# Patient Record
Sex: FEMALE | Race: WHITE | NOT HISPANIC OR LATINO | Employment: PART TIME | URBAN - METROPOLITAN AREA
[De-identification: names, ages, dates, MRNs, and addresses within clinical notes are randomized per-mention and may not be internally consistent; named-entity substitution may affect disease eponyms.]

---

## 2024-01-02 ENCOUNTER — HOSPITAL ENCOUNTER (EMERGENCY)
Facility: HOSPITAL | Age: 24
Discharge: HOME | End: 2024-01-03
Attending: EMERGENCY MEDICINE | Admitting: EMERGENCY MEDICINE
Payer: COMMERCIAL

## 2024-01-02 ENCOUNTER — APPOINTMENT (OUTPATIENT)
Dept: CT IMAGING | Facility: HOSPITAL | Age: 24
End: 2024-01-02
Payer: COMMERCIAL

## 2024-01-02 DIAGNOSIS — R16.1 SPLENOMEGALY: ICD-10-CM

## 2024-01-02 DIAGNOSIS — R00.0 SINUS TACHYCARDIA BY ELECTROCARDIOGRAM: Primary | ICD-10-CM

## 2024-01-02 DIAGNOSIS — E87.6 HYPOKALEMIA: ICD-10-CM

## 2024-01-02 DIAGNOSIS — D50.9 MICROCYTIC ANEMIA: ICD-10-CM

## 2024-01-02 DIAGNOSIS — D75.839 THROMBOCYTOSIS, UNSPECIFIED: ICD-10-CM

## 2024-01-02 DIAGNOSIS — J18.9 ATYPICAL PNEUMONIA: ICD-10-CM

## 2024-01-02 DIAGNOSIS — R60.9 PERIPHERAL EDEMA: ICD-10-CM

## 2024-01-02 DIAGNOSIS — L03.115 CELLULITIS OF BOTH FEET: ICD-10-CM

## 2024-01-02 DIAGNOSIS — L03.116 CELLULITIS OF BOTH FEET: ICD-10-CM

## 2024-01-02 DIAGNOSIS — R79.89 ELEVATED D-DIMER: ICD-10-CM

## 2024-01-02 DIAGNOSIS — M54.9 ACUTE UPPER BACK PAIN: ICD-10-CM

## 2024-01-02 DIAGNOSIS — E88.09 HYPOALBUMINEMIA: ICD-10-CM

## 2024-01-02 DIAGNOSIS — D72.825 BANDEMIA: ICD-10-CM

## 2024-01-02 DIAGNOSIS — E87.1 HYPONATREMIA: ICD-10-CM

## 2024-01-02 LAB
ALBUMIN SERPL-MCNC: 2.8 G/DL (ref 3.5–5.2)
ALBUMIN/GLOB SERPL: 0.6 G/DL
ALP SERPL-CCNC: 116 U/L (ref 39–117)
ALT SERPL W P-5'-P-CCNC: 8 U/L (ref 1–33)
ANION GAP SERPL CALCULATED.3IONS-SCNC: 14 MMOL/L (ref 5–15)
AST SERPL-CCNC: 12 U/L (ref 1–32)
B-HCG UR QL: NEGATIVE
BACTERIA UR QL AUTO: ABNORMAL /HPF
BASOPHILS # BLD MANUAL: 0 10*3/MM3 (ref 0–0.2)
BASOPHILS NFR BLD MANUAL: 0 % (ref 0–1.5)
BILIRUB SERPL-MCNC: 0.3 MG/DL (ref 0–1.2)
BILIRUB UR QL STRIP: ABNORMAL
BUN SERPL-MCNC: 10 MG/DL (ref 6–20)
BUN/CREAT SERPL: 14.7 (ref 7–25)
CALCIUM SPEC-SCNC: 8.1 MG/DL (ref 8.6–10.5)
CHLORIDE SERPL-SCNC: 95 MMOL/L (ref 98–107)
CLARITY UR: ABNORMAL
CO2 SERPL-SCNC: 22 MMOL/L (ref 22–29)
COLOR UR: ABNORMAL
CREAT SERPL-MCNC: 0.68 MG/DL (ref 0.57–1)
D DIMER PPP FEU-MCNC: 1.94 MCGFEU/ML (ref 0–0.5)
DEPRECATED RDW RBC AUTO: 39.6 FL (ref 37–54)
EGFRCR SERPLBLD CKD-EPI 2021: 127.3 ML/MIN/1.73
EOSINOPHIL # BLD MANUAL: 0.1 10*3/MM3 (ref 0–0.4)
EOSINOPHIL NFR BLD MANUAL: 1 % (ref 0.3–6.2)
ERYTHROCYTE [DISTWIDTH] IN BLOOD BY AUTOMATED COUNT: 17.5 % (ref 12.3–15.4)
GLOBULIN UR ELPH-MCNC: 4.6 GM/DL
GLUCOSE SERPL-MCNC: 94 MG/DL (ref 65–99)
GLUCOSE UR STRIP-MCNC: NEGATIVE MG/DL
HCT VFR BLD AUTO: 28.9 % (ref 34–46.6)
HGB BLD-MCNC: 8.3 G/DL (ref 12–15.9)
HGB UR QL STRIP.AUTO: ABNORMAL
HYALINE CASTS UR QL AUTO: ABNORMAL /LPF
HYPOCHROMIA BLD QL: ABNORMAL
KETONES UR QL STRIP: ABNORMAL
LEUKOCYTE ESTERASE UR QL STRIP.AUTO: ABNORMAL
LIPASE SERPL-CCNC: 9 U/L (ref 13–60)
LYMPHOCYTES # BLD MANUAL: 0.48 10*3/MM3 (ref 0.7–3.1)
LYMPHOCYTES NFR BLD MANUAL: 8 % (ref 5–12)
MAGNESIUM SERPL-MCNC: 2.1 MG/DL (ref 1.6–2.6)
MCH RBC QN AUTO: 18.2 PG (ref 26.6–33)
MCHC RBC AUTO-ENTMCNC: 28.7 G/DL (ref 31.5–35.7)
MCV RBC AUTO: 63.5 FL (ref 79–97)
MICROCYTES BLD QL: ABNORMAL
MONOCYTES # BLD: 0.77 10*3/MM3 (ref 0.1–0.9)
MUCOUS THREADS URNS QL MICRO: ABNORMAL /HPF
NEUTROPHILS # BLD AUTO: 8.29 10*3/MM3 (ref 1.7–7)
NEUTROPHILS NFR BLD MANUAL: 58 % (ref 42.7–76)
NEUTS BAND NFR BLD MANUAL: 28 % (ref 0–5)
NITRITE UR QL STRIP: NEGATIVE
NT-PROBNP SERPL-MCNC: 308.6 PG/ML (ref 0–450)
PH UR STRIP.AUTO: 6 [PH] (ref 5–8)
PLAT MORPH BLD: NORMAL
PLATELET # BLD AUTO: 564 10*3/MM3 (ref 140–450)
PMV BLD AUTO: 8.4 FL (ref 6–12)
POTASSIUM SERPL-SCNC: 3.2 MMOL/L (ref 3.5–5.2)
PROT SERPL-MCNC: 7.4 G/DL (ref 6–8.5)
PROT UR QL STRIP: ABNORMAL
RBC # BLD AUTO: 4.55 10*6/MM3 (ref 3.77–5.28)
RBC # UR STRIP: ABNORMAL /HPF
REF LAB TEST METHOD: ABNORMAL
SODIUM SERPL-SCNC: 131 MMOL/L (ref 136–145)
SP GR UR STRIP: 1.03 (ref 1–1.03)
SQUAMOUS #/AREA URNS HPF: ABNORMAL /HPF
TRANS CELLS #/AREA URNS HPF: ABNORMAL /HPF
TROPONIN T SERPL HS-MCNC: <6 NG/L
UROBILINOGEN UR QL STRIP: ABNORMAL
VARIANT LYMPHS NFR BLD MANUAL: 5 % (ref 19.6–45.3)
WBC # UR STRIP: ABNORMAL /HPF
WBC MORPH BLD: NORMAL
WBC NRBC COR # BLD AUTO: 9.64 10*3/MM3 (ref 3.4–10.8)

## 2024-01-02 PROCEDURE — 93005 ELECTROCARDIOGRAM TRACING: CPT | Performed by: EMERGENCY MEDICINE

## 2024-01-02 PROCEDURE — 85379 FIBRIN DEGRADATION QUANT: CPT | Performed by: EMERGENCY MEDICINE

## 2024-01-02 PROCEDURE — 83735 ASSAY OF MAGNESIUM: CPT | Performed by: EMERGENCY MEDICINE

## 2024-01-02 PROCEDURE — 99285 EMERGENCY DEPT VISIT HI MDM: CPT

## 2024-01-02 PROCEDURE — 25810000003 SODIUM CHLORIDE 0.9 % SOLUTION: Performed by: EMERGENCY MEDICINE

## 2024-01-02 PROCEDURE — 81025 URINE PREGNANCY TEST: CPT | Performed by: EMERGENCY MEDICINE

## 2024-01-02 PROCEDURE — 71275 CT ANGIOGRAPHY CHEST: CPT

## 2024-01-02 PROCEDURE — 85007 BL SMEAR W/DIFF WBC COUNT: CPT | Performed by: EMERGENCY MEDICINE

## 2024-01-02 PROCEDURE — 85025 COMPLETE CBC W/AUTO DIFF WBC: CPT | Performed by: EMERGENCY MEDICINE

## 2024-01-02 PROCEDURE — 83880 ASSAY OF NATRIURETIC PEPTIDE: CPT | Performed by: EMERGENCY MEDICINE

## 2024-01-02 PROCEDURE — 83690 ASSAY OF LIPASE: CPT | Performed by: EMERGENCY MEDICINE

## 2024-01-02 PROCEDURE — 81001 URINALYSIS AUTO W/SCOPE: CPT | Performed by: EMERGENCY MEDICINE

## 2024-01-02 PROCEDURE — 93005 ELECTROCARDIOGRAM TRACING: CPT

## 2024-01-02 PROCEDURE — 25510000001 IOPAMIDOL PER 1 ML: Performed by: EMERGENCY MEDICINE

## 2024-01-02 PROCEDURE — 80053 COMPREHEN METABOLIC PANEL: CPT | Performed by: EMERGENCY MEDICINE

## 2024-01-02 PROCEDURE — 84484 ASSAY OF TROPONIN QUANT: CPT | Performed by: EMERGENCY MEDICINE

## 2024-01-02 RX ORDER — SODIUM CHLORIDE 0.9 % (FLUSH) 0.9 %
10 SYRINGE (ML) INJECTION AS NEEDED
Status: DISCONTINUED | OUTPATIENT
Start: 2024-01-02 | End: 2024-01-03 | Stop reason: HOSPADM

## 2024-01-02 RX ORDER — POTASSIUM CHLORIDE 750 MG/1
40 CAPSULE, EXTENDED RELEASE ORAL ONCE
Status: COMPLETED | OUTPATIENT
Start: 2024-01-03 | End: 2024-01-03

## 2024-01-02 RX ADMIN — SODIUM CHLORIDE 1000 ML: 9 INJECTION, SOLUTION INTRAVENOUS at 22:25

## 2024-01-02 RX ADMIN — IOPAMIDOL 80 ML: 755 INJECTION, SOLUTION INTRAVENOUS at 23:46

## 2024-01-03 ENCOUNTER — HOSPITAL ENCOUNTER (OUTPATIENT)
Dept: CARDIOLOGY | Facility: HOSPITAL | Age: 24
Discharge: HOME | End: 2024-01-03
Admitting: EMERGENCY MEDICINE
Payer: COMMERCIAL

## 2024-01-03 VITALS
HEIGHT: 63 IN | OXYGEN SATURATION: 100 % | HEART RATE: 131 BPM | SYSTOLIC BLOOD PRESSURE: 124 MMHG | WEIGHT: 188 LBS | BODY MASS INDEX: 33.31 KG/M2 | TEMPERATURE: 98.3 F | DIASTOLIC BLOOD PRESSURE: 62 MMHG | RESPIRATION RATE: 16 BRPM

## 2024-01-03 VITALS — BODY MASS INDEX: 33.31 KG/M2 | WEIGHT: 188 LBS | HEIGHT: 63 IN

## 2024-01-03 DIAGNOSIS — R79.89 ELEVATED D-DIMER: ICD-10-CM

## 2024-01-03 DIAGNOSIS — R60.9 PERIPHERAL EDEMA: ICD-10-CM

## 2024-01-03 LAB

## 2024-01-03 PROCEDURE — 93970 EXTREMITY STUDY: CPT

## 2024-01-03 RX ORDER — CEPHALEXIN 250 MG/1
500 CAPSULE ORAL ONCE
Status: COMPLETED | OUTPATIENT
Start: 2024-01-03 | End: 2024-01-03

## 2024-01-03 RX ORDER — FUROSEMIDE 20 MG/1
20 TABLET ORAL DAILY
Qty: 3 TABLET | Refills: 0 | Status: SHIPPED | OUTPATIENT
Start: 2024-01-03

## 2024-01-03 RX ORDER — FUROSEMIDE 20 MG/1
20 TABLET ORAL ONCE
Status: COMPLETED | OUTPATIENT
Start: 2024-01-03 | End: 2024-01-03

## 2024-01-03 RX ORDER — POTASSIUM CHLORIDE 750 MG/1
20 TABLET, FILM COATED, EXTENDED RELEASE ORAL DAILY
Qty: 10 TABLET | Refills: 0 | Status: SHIPPED | OUTPATIENT
Start: 2024-01-03

## 2024-01-03 RX ORDER — ACETAMINOPHEN 500 MG
1000 TABLET ORAL ONCE
Status: COMPLETED | OUTPATIENT
Start: 2024-01-03 | End: 2024-01-03

## 2024-01-03 RX ORDER — DOXYCYCLINE 100 MG/1
100 CAPSULE ORAL 2 TIMES DAILY
Qty: 20 CAPSULE | Refills: 0 | Status: SHIPPED | OUTPATIENT
Start: 2024-01-03

## 2024-01-03 RX ORDER — CEPHALEXIN 500 MG/1
500 CAPSULE ORAL 4 TIMES DAILY
Qty: 40 CAPSULE | Refills: 0 | Status: SHIPPED | OUTPATIENT
Start: 2024-01-03

## 2024-01-03 RX ORDER — DOXYCYCLINE 100 MG/1
100 CAPSULE ORAL ONCE
Status: COMPLETED | OUTPATIENT
Start: 2024-01-03 | End: 2024-01-03

## 2024-01-03 RX ADMIN — POTASSIUM CHLORIDE 40 MEQ: 750 CAPSULE, EXTENDED RELEASE ORAL at 00:03

## 2024-01-03 RX ADMIN — DOXYCYCLINE 100 MG: 100 CAPSULE ORAL at 01:01

## 2024-01-03 RX ADMIN — FUROSEMIDE 20 MG: 20 TABLET ORAL at 01:01

## 2024-01-03 RX ADMIN — ACETAMINOPHEN 1000 MG: 500 TABLET ORAL at 01:01

## 2024-01-03 RX ADMIN — CEPHALEXIN 500 MG: 250 CAPSULE ORAL at 01:01

## 2024-01-03 NOTE — ED PROVIDER NOTES
Subjective   History of Present Illness  23 year old female with history of Crohn's disease and asthma visiting here from [place] presents to the emergency department accompanied by her mother with concerns about bilateral lower extremity pain and swelling for the past few days which is worse today. She has had an influenza-like illness for the past few days, with positive recent sick contacts with similar influenza-like illness symptoms as well as strep pharyngitis. She really hasn't been feeling well for about a week. She is usually fair skinned but her mother thinks she appears more pale today. She has been sleeping more than usual recently. She is maintained on Remicade and is due for a dose on 1/5/24. She has had vomiting and diarrhea recently, more than baseline. She has some dyspnea on exertion, but has a history of asthma. Decreased activity recently. She is scheduled to fly back to [place] on 1/3/24 at about 8 PM. She doesn't eat much meat in general, and has had anemia before. She is not taking iron supplements. She reports some pain to her upper back near her shoulder blades. She went to urgent care today and had a reportedly negative test for flu and COVID-19 today.      Review of Systems   Constitutional:  Positive for activity change and fatigue.   Eyes:  Negative for photophobia and discharge.   Respiratory:  Positive for shortness of breath. Negative for stridor.    Cardiovascular:  Positive for leg swelling. Negative for chest pain.   Gastrointestinal:  Positive for diarrhea and vomiting.   Genitourinary:         [facility] 12/24/23   Musculoskeletal:  Positive for arthralgias and myalgias.   Skin:  Positive for rash (feet have a faint bruised red appearance and feel warm to her mother).   Neurological:  Negative for facial asymmetry and speech difficulty.       History reviewed. No pertinent past medical history. Asthma, Crohn's disease.     No Known Allergies    History reviewed. No pertinent surgical  history.    History reviewed. No pertinent family history.    Social History     Socioeconomic History    Marital status: Single   Tobacco Use    Smoking status: Never     Passive exposure: Never    Smokeless tobacco: Never   Vaping Use    Vaping Use: Never used   Substance and Sexual Activity    Alcohol use: Never    Drug use: Never    Sexual activity: Defer           Objective   Physical Exam  Vitals and nursing note reviewed.   Constitutional:       Appearance: She is ill-appearing. She is not diaphoretic.      Comments: BMI 33   HENT:      Mouth/Throat:      Comments: Tacky pale mucosa  Eyes:      General: No scleral icterus.     Comments: No photophobia or nystagmus.   Neck:      Comments: No meningismus.  Cardiovascular:      Heart sounds: No murmur heard.     No friction rub. No gallop.      Comments: S1, S2, mild regular tachycardia  Pulmonary:      Effort: Pulmonary effort is normal. No respiratory distress.      Breath sounds: Normal breath sounds. No stridor. No wheezing, rhonchi or rales.   Abdominal:      General: There is no distension.      Palpations: Abdomen is soft.      Tenderness: There is no abdominal tenderness. There is no guarding.   Musculoskeletal:      Cervical back: Neck supple.      Comments: 2+ edema bilateral feet and legs, symmetric, with mild erythema, warmth, and tenderness.   Skin:     General: Skin is warm and dry.      Coloration: Skin is pale. Skin is not jaundiced.   Neurological:      Mental Status: She is alert.      Comments: Normal speech, no dysarthria. Gait is slightly antalgic, but no ataxia seen. Moves all extremities. No facial droop.                    ED Course  ED Course as of 01/03/24 0058   Tue Jan 02, 2024   2318 UA likely contaminated clean catch sample [LD]      ED Course User Index  [LD] Christine Hemphill MD                                 Prior to discharge, pallor has improved. Results and plan discussed with her mother and the patient at the bedside. All  questions addressed.             Medical Decision Making  Differential diagnosis includes dehydration, hypoalbuminemia, malnutrition, anemia, viral illness, pulmonary embolus, pneumonia, pleural effusion, asthma exacerbation, myocarditis, heart failure, liver disease, acute kidney injury, deep venous thrombosis, lower extremity cellulitis, peripheral edema, and others.    Problems Addressed:  Acute upper back pain: complicated acute illness or injury  Bandemia: complicated acute illness or injury  Elevated d-dimer: complicated acute illness or injury  Hypoalbuminemia: complicated acute illness or injury  Hypokalemia: complicated acute illness or injury  Hyponatremia: complicated acute illness or injury  Microcytic anemia: complicated acute illness or injury  Peripheral edema: complicated acute illness or injury  Sinus tachycardia by electrocardiogram: complicated acute illness or injury  Thrombocytosis, unspecified: complicated acute illness or injury    Amount and/or Complexity of Data Reviewed  Independent Historian:      Details: Mother at bedside  External Data Reviewed:      Details: No prior records are immediately available for comparison.  Labs: ordered. Decision-making details documented in ED Course.  Radiology: ordered. Decision-making details documented in ED Course.  ECG/medicine tests: ordered and independent interpretation performed. Decision-making details documented in ED Course.     Details: EKG at 2052 shows sinus tachycardia at a rate of 116 beats per minute, normal intervals, nonspecific ST/T wave changes, no prior EKG available immediately for comparison.     Risk  OTC drugs.  Prescription drug management.      Recent Results (from the past 24 hour(s))   ECG 12 Lead Other; abnormal ekg from urgent care    Collection Time: 01/02/24  8:52 PM   Result Value Ref Range    QT Interval 300 ms    QTC Interval 417 ms   Comprehensive Metabolic Panel    Collection Time: 01/02/24 10:19 PM    Specimen:  Blood   Result Value Ref Range    Glucose 94 65 - 99 mg/dL    BUN 10 6 - 20 mg/dL    Creatinine 0.68 0.57 - 1.00 mg/dL    Sodium 131 (L) 136 - 145 mmol/L    Potassium 3.2 (L) 3.5 - 5.2 mmol/L    Chloride 95 (L) 98 - 107 mmol/L    CO2 22.0 22.0 - 29.0 mmol/L    Calcium 8.1 (L) 8.6 - 10.5 mg/dL    Total Protein 7.4 6.0 - 8.5 g/dL    Albumin 2.8 (L) 3.5 - 5.2 g/dL    ALT (SGPT) 8 1 - 33 U/L    AST (SGOT) 12 1 - 32 U/L    Alkaline Phosphatase 116 39 - 117 U/L    Total Bilirubin 0.3 0.0 - 1.2 mg/dL    Globulin 4.6 gm/dL    A/G Ratio 0.6 g/dL    BUN/Creatinine Ratio 14.7 7.0 - 25.0    Anion Gap 14.0 5.0 - 15.0 mmol/L    eGFR 127.3 >60.0 mL/min/1.73   Lipase    Collection Time: 01/02/24 10:19 PM    Specimen: Blood   Result Value Ref Range    Lipase 9 (L) 13 - 60 U/L   BNP    Collection Time: 01/02/24 10:19 PM    Specimen: Blood   Result Value Ref Range    proBNP 308.6 0.0 - 450.0 pg/mL   D-dimer, Quantitative    Collection Time: 01/02/24 10:19 PM    Specimen: Blood   Result Value Ref Range    D-Dimer, Quantitative 1.94 (H) 0.00 - 0.50 MCGFEU/mL   Single High Sensitivity Troponin T    Collection Time: 01/02/24 10:19 PM    Specimen: Blood   Result Value Ref Range    HS Troponin T <6 <14 ng/L   CBC Auto Differential    Collection Time: 01/02/24 10:19 PM    Specimen: Blood   Result Value Ref Range    WBC 9.64 3.40 - 10.80 10*3/mm3    RBC 4.55 3.77 - 5.28 10*6/mm3    Hemoglobin 8.3 (L) 12.0 - 15.9 g/dL    Hematocrit 28.9 (L) 34.0 - 46.6 %    MCV 63.5 (L) 79.0 - 97.0 fL    MCH 18.2 (L) 26.6 - 33.0 pg    MCHC 28.7 (L) 31.5 - 35.7 g/dL    RDW 17.5 (H) 12.3 - 15.4 %    RDW-SD 39.6 37.0 - 54.0 fl    MPV 8.4 6.0 - 12.0 fL    Platelets 564 (H) 140 - 450 10*3/mm3   Magnesium    Collection Time: 01/02/24 10:19 PM    Specimen: Blood   Result Value Ref Range    Magnesium 2.1 1.6 - 2.6 mg/dL   Manual Differential    Collection Time: 01/02/24 10:19 PM    Specimen: Blood   Result Value Ref Range    Neutrophil % 58.0 42.7 - 76.0 %     Lymphocyte % 5.0 (L) 19.6 - 45.3 %    Monocyte % 8.0 5.0 - 12.0 %    Eosinophil % 1.0 0.3 - 6.2 %    Basophil % 0.0 0.0 - 1.5 %    Bands %  28.0 (H) 0.0 - 5.0 %    Neutrophils Absolute 8.29 (H) 1.70 - 7.00 10*3/mm3    Lymphocytes Absolute 0.48 (L) 0.70 - 3.10 10*3/mm3    Monocytes Absolute 0.77 0.10 - 0.90 10*3/mm3    Eosinophils Absolute 0.10 0.00 - 0.40 10*3/mm3    Basophils Absolute 0.00 0.00 - 0.20 10*3/mm3    Hypochromia Large/3+ None Seen    Microcytes Large/3+ None Seen    WBC Morphology Normal Normal    Platelet Morphology Normal Normal   Urinalysis With Microscopic If Indicated (No Culture) - Urine, Clean Catch    Collection Time: 01/02/24 10:20 PM    Specimen: Urine, Clean Catch   Result Value Ref Range    Color, UA Dark Yellow (A) Yellow, Straw    Appearance, UA Turbid (A) Clear    pH, UA 6.0 5.0 - 8.0    Specific Gravity, UA 1.027 1.001 - 1.030    Glucose, UA Negative Negative    Ketones, UA 15 mg/dL (1+) (A) Negative    Bilirubin, UA Moderate (2+) (A) Negative    Blood, UA Moderate (2+) (A) Negative    Protein, UA 30 mg/dL (1+) (A) Negative    Leuk Esterase, UA Small (1+) (A) Negative    Nitrite, UA Negative Negative    Urobilinogen, UA 1.0 E.U./dL 0.2 - 1.0 E.U./dL   Pregnancy, Urine - Urine, Clean Catch    Collection Time: 01/02/24 10:20 PM    Specimen: Urine, Clean Catch   Result Value Ref Range    HCG, Urine QL Negative Negative   Urinalysis, Microscopic Only - Urine, Clean Catch    Collection Time: 01/02/24 10:20 PM    Specimen: Urine, Clean Catch   Result Value Ref Range    RBC, UA 0-2 None Seen, 0-2 /HPF    WBC, UA 6-10 (A) None Seen, 0-2 /HPF    Bacteria, UA 3+ (A) None Seen, Trace /HPF    Squamous Epithelial Cells, UA 21-30 (A) None Seen, 0-2 /HPF    Transitional Epithelial Cells, UA 0-2 0 - 2 /HPF    Hyaline Casts, UA 0-6 0 - 6 /LPF    Mucus, UA Small/1+ (A) None Seen, Trace /HPF    Methodology Manual Light Microscopy      Note: In addition to lab results from this visit, the labs listed above  may include labs taken at another facility or during a different encounter within the last 24 hours. Please correlate lab times with ED admission and discharge times for further clarification of the services performed during this visit.    CT Angiogram Chest Pulmonary Embolism   Final Result   Impression:   1.No acute cardiopulmonary abnormality. No evidence of pulmonary embolism.   2.9 mm groundglass opacity in the right upper lobe. This is favored to be infectious or inflammatory.   3.Mild splenomegaly.   4.Suspected hepatic steatosis.            Electronically Signed: Terrance Cortez MD     1/3/2024 12:09 AM EST     Workstation ID: [id number]        Vitals:    01/02/24 2228 01/02/24 2233 01/02/24 2248 01/02/24 2349   BP:    124/62   Pulse: 119 (!) 127 114 (!) 131   Resp:       Temp:       SpO2: 99% 99% 100% 100%   Weight:       Height:         Medications   sodium chloride 0.9 % flush 10 mL (has no administration in time range)   furosemide (LASIX) tablet 20 mg (has no administration in time range)   doxycycline (MONODOX) capsule 100 mg (has no administration in time range)   cephalexin (KEFLEX) capsule 500 mg (has no administration in time range)   acetaminophen (TYLENOL) tablet 1,000 mg (has no administration in time range)   sodium chloride 0.9 % bolus 1,000 mL (0 mL Intravenous Stopped 1/3/24 0047)   iopamidol (ISOVUE-370) 76 % injection 100 mL (80 mL Intravenous Given 1/2/24 2346)   potassium chloride (MICRO-K/KLOR-CON) CR capsule (40 mEq Oral Given 1/3/24 0003)     ECG/EMG Results (last 24 hours)       Procedure Component Value Units Date/Time    ECG 12 Lead Other; abnormal ekg from urgent care [[id number]] Collected: 01/02/24 2052     Updated: 01/02/24 2052     QT Interval 300 ms      QTC Interval 417 ms     Narrative:      Test Reason : Other~  Blood Pressure :   */*   mmHG  Vent. Rate : 116 BPM     Atrial Rate : 116 BPM     P-R Int : 126 ms          QRS Dur :  88 ms      QT Int : 300 ms       P-R-T Axes :   20  38 -27 degrees     QTc Int : 417 ms    Sinus tachycardia  ST & T wave abnormality, consider anterolateral ischemia  Abnormal ECG  No previous ECGs available    Referred By:            Confirmed By:           ECG 12 Lead Other; abnormal ekg from urgent care   Preliminary Result   Test Reason : Other~   Blood Pressure :   */*   mmHG   Vent. Rate : 116 BPM     Atrial Rate : 116 BPM      P-R Int : 126 ms          QRS Dur :  88 ms       QT Int : 300 ms       P-R-T Axes :  20  38 -27 degrees      QTc Int : 417 ms      Sinus tachycardia   ST & T wave abnormality, consider anterolateral ischemia   Abnormal ECG   No previous ECGs available      Referred By:            Confirmed By:         Discharge instructions include:  If you have a Duplex Venous study ordered and have not received a phone call to schedule this test by 10:00 am tomorrow, please call.    [phone] (Monday - Friday)    [phone] (Weekends)      Use Slo-FE over the counter iron supplement, start at one tablet daily. If you tolerate it well, start taking one twice a day. If you do not tolerate it well, take one every other day. Try to take in more protein in your diet.       Final diagnoses:   Sinus tachycardia by electrocardiogram   Elevated d-dimer   Hypokalemia   Peripheral edema   Hypoalbuminemia   Hyponatremia   Bandemia   Microcytic anemia   Acute upper back pain   Thrombocytosis, unspecified   Splenomegaly   Atypical pneumonia   Cellulitis of both feet       ED Disposition  ED Disposition       ED Disposition   Discharge    Condition   Stable    Comment   --               PATIENT CONNECTION - [facility] [address]  [phone]  Schedule an appointment as soon as possible for a visit in 2 days  Follow up with your primary care provider when you return to [place], or call this number to establish care with a primary care provider in our system if you end up staying in town.         Medication List        New Prescriptions       cephalexin 500 MG capsule  Commonly known as: KEFLEX  Take 1 capsule by mouth 4 (Four) Times a Day.     doxycycline 100 MG capsule  Commonly known as: MONODOX  Take 1 capsule by mouth 2 (Two) Times a Day.     furosemide 20 MG tablet  Commonly known as: LASIX  Take 1 tablet by mouth Daily.     potassium chloride 10 MEQ CR tablet  Take 2 tablets by mouth Daily.               Where to Get Your Medications        These medications were sent to [facility] [id number] - [place], [place] - [address] [place] [address] ANIL RUELAS AT [facility] & [address] [place] [address] - [phone]  - [phone] [address] , [facility] [address]      Phone: [phone]   cephalexin 500 MG capsule  doxycycline 100 MG capsule  furosemide 20 MG tablet  potassium chloride 10 MEQ CR tablet            Christine Hemphill MD  01/04/24 4933

## 2024-01-03 NOTE — DISCHARGE INSTRUCTIONS
If you have a Duplex Venous study ordered and have not received a phone call to schedule this test by 10:00 am tomorrow, please call.    [phone] (Monday - Friday)    [phone] (Weekends)      Use Slo-FE over the counter iron supplement, start at one tablet daily. If you tolerate it well, start taking one twice a day. If you do not tolerate it well, take one every other day. Try to take in more protein in your diet.

## 2024-01-04 LAB
QT INTERVAL: 300 MS
QTC INTERVAL: 417 MS